# Patient Record
Sex: FEMALE | Race: WHITE | Employment: UNEMPLOYED | URBAN - METROPOLITAN AREA
[De-identification: names, ages, dates, MRNs, and addresses within clinical notes are randomized per-mention and may not be internally consistent; named-entity substitution may affect disease eponyms.]

---

## 2022-08-09 ENCOUNTER — OFFICE VISIT (OUTPATIENT)
Dept: PRIMARY CARE CLINIC | Age: 52
End: 2022-08-09
Payer: MEDICAID

## 2022-08-09 VITALS
HEIGHT: 62 IN | DIASTOLIC BLOOD PRESSURE: 72 MMHG | HEART RATE: 86 BPM | SYSTOLIC BLOOD PRESSURE: 120 MMHG | OXYGEN SATURATION: 98 % | WEIGHT: 184.3 LBS | BODY MASS INDEX: 33.92 KG/M2 | TEMPERATURE: 97.2 F

## 2022-08-09 DIAGNOSIS — R05.9 COUGH: ICD-10-CM

## 2022-08-09 DIAGNOSIS — Z00.00 HEALTHCARE MAINTENANCE: ICD-10-CM

## 2022-08-09 DIAGNOSIS — R53.83 FATIGUE, UNSPECIFIED TYPE: ICD-10-CM

## 2022-08-09 DIAGNOSIS — D25.9 UTERINE LEIOMYOMA, UNSPECIFIED LOCATION: ICD-10-CM

## 2022-08-09 DIAGNOSIS — R91.1 LUNG NODULE: ICD-10-CM

## 2022-08-09 DIAGNOSIS — Z12.31 SCREENING MAMMOGRAM FOR HIGH-RISK PATIENT: ICD-10-CM

## 2022-08-09 DIAGNOSIS — E78.5 HYPERLIPIDEMIA, UNSPECIFIED HYPERLIPIDEMIA TYPE: Primary | ICD-10-CM

## 2022-08-09 DIAGNOSIS — F32.9 MAJOR DEPRESSIVE DISORDER WITH CURRENT ACTIVE EPISODE, UNSPECIFIED DEPRESSION EPISODE SEVERITY, UNSPECIFIED WHETHER RECURRENT: ICD-10-CM

## 2022-08-09 DIAGNOSIS — Z00.00 EXAMINATION: ICD-10-CM

## 2022-08-09 DIAGNOSIS — Z13.29 SCREENING FOR HYPOTHYROIDISM: ICD-10-CM

## 2022-08-09 PROCEDURE — 99205 OFFICE O/P NEW HI 60 MIN: CPT | Performed by: INTERNAL MEDICINE

## 2022-08-09 PROCEDURE — 93000 ELECTROCARDIOGRAM COMPLETE: CPT | Performed by: INTERNAL MEDICINE

## 2022-08-09 RX ORDER — FLUOXETINE HYDROCHLORIDE 40 MG/1
CAPSULE ORAL
COMMUNITY
End: 2022-08-09

## 2022-08-09 RX ORDER — ATORVASTATIN CALCIUM 20 MG/1
TABLET, FILM COATED ORAL
COMMUNITY

## 2022-08-09 RX ORDER — FLUOXETINE HYDROCHLORIDE 20 MG/1
20 CAPSULE ORAL DAILY
Qty: 30 CAPSULE | Refills: 0 | Status: SHIPPED | OUTPATIENT
Start: 2022-08-09

## 2022-08-09 NOTE — PROGRESS NOTES
Chief Complaint   Patient presents with    New Patient     1. States she has 'tumors' in uterus, lung and liver. and sleeps 12-14 hours a day with feelings of exhaustion and feelings of weakness       HPI:  Patient is here established as a new patient she moved to [place] from [place]  Problem #1 extreme fatigue she is sleeps 12 to 14 hours a day gets tired when she does anything she had a history of depression was taking Prozac 40 mg capsule daily stopped taking it when she lost her insurance about a year ago  Had suicidal attempt 2001 by taking pills no suicidal ideation at the present time    Problem #2 patient stated that she has tumor in her uterus liver and lung she had been in menopause for 6-month she had a CT scan in December 2020 at [facility] showed left-sided fibroid tumor in the uterus with hemangiomas on the liver and a 13 mm noncalcified lung nodule  follow-up recommended. Patient is a smoker half a pack a day for 14 years  : #3 patient stated that she had a history of mitral valve prolapse also stated that she had a small heart attack never hospitalized for it? No history of heart catheterization or follow-up with cardiology  Problem #4 patient was on atorvastatin but has not taken it for less than a year. Past Medical History, Surgical History, and Family History has been reviewed and updated.     Review of Systems:  Constitutional:  No fever, no fatigue, no chills, no headaches, no weight change  Dermatology:  No rash, no mole, no dry or sensitive skin  ENT:  No cough, no sore throat, no sinus pain, no runny nose, no ear pain  Cardiology:  No chest pain, no palpitations, no leg edema, no shortness of breath, no PND  Gastroenterology:  No dysphagia, no abdominal pain, no nausea, no vomiting, no constipation, no diarrhea, no heartburn  Musculoskeletal:  No joint pain, no leg cramps, no back pain, no muscle aches  Respiratory:  No shortness of breath, no orthopnea, no wheezing, no HILL, no hemoptysis  Urology:  No blood in the urine, no urinary frequency, no urinary incontinence, no urinary urgency, no nocturia, no dysuria    Vitals:    08/09/22 1517   BP: 120/72   Site: Right Upper Arm   Position: Sitting   Cuff Size: Large Adult   Pulse: 86   Temp: 97.2 °F (36.2 °C)   SpO2: 98%   Weight: 184 lb 4.8 oz (83.6 kg)   Height: 5' 2.1\" (1.577 m)       General:  Patient alert and oriented x 3, NAD, pleasant  HEENT:  Atraumatic, normocephalic, PERRLA, EOMI, clear conjunctiva, TMs clear, nose-clear, throat - no erythema  Neck:  Supple, no goiter, no carotid bruits, no LAD  Lungs:  CTA   Heart:  RRR, no murmurs, gallops or rubs  Abdomen:  Soft/nt/nd, + bowel sounds  Extremities:  No clubbing, cyanosis or edema  Skin: unremarkable  Breast exam chaperoned by RN unremarkable bilaterally    No results found for: LABA1C, CHOL, TRIG, HDL, LDLCHOLESTEROL, LDLCALC, LABVLDL, VLDL, CHOLHDLRATIO, NA, K, CL, CO2, BUN, CREATININE, GLUCOSE, CALCIUM, PROT, LABALBU, BILITOT, ALKPHOS, AST, ALT, LABGLOM, GFRAA, AGRATIO, GLOB     No results found. Assessment/Plan:   Generalized fatigue. Fibroid uterus  Hepatic hemangiomas  Lung nodule  Depression  History of hyperlipidemia  Health maintenance, mammogram    Outpatient Encounter Medications as of 8/9/2022   Medication Sig Dispense Refill    FLUoxetine (PROZAC) 20 MG capsule Take 1 capsule by mouth in the morning. 30 capsule 0    atorvastatin (LIPITOR) 20 MG tablet atorvastatin 20 mg tablet   TAKE 1 TABLET BY MOUTH EVERY DAY      [DISCONTINUED] FLUoxetine (PROZAC) 40 MG capsule fluoxetine 40 mg capsule   Take 1 capsule every day by oral route in the morning. No facility-administered encounter medications on file as of 8/9/2022. Scott Rocha was seen today for new patient. Diagnoses and all orders for this visit:    Hyperlipidemia, unspecified hyperlipidemia type  -     LIPID PANEL; Future  -     Comprehensive Metabolic Panel;  Future    Examination  -     EKG 12 Lead    Screening mammogram for high-risk patient  -     [facility] DIGITAL SCREEN W OR WO CAD BILATERAL; Future    Fatigue, unspecified type  -     CBC with Auto Differential; Future  -     Sedimentation Rate; Future    Screening for hypothyroidism  -     TSH; Future    Cough  -     XR CHEST STANDARD (2 VW); Future    Major depressive disorder with current active episode, unspecified depression episode severity, unspecified whether recurrent  -     FLUoxetine (PROZAC) 20 MG capsule; Take 1 capsule by mouth in the morning. Uterine leiomyoma, unspecified location  -     US PELVIS COMPLETE; Future    Healthcare maintenance  -     Hepatitis C Antibody; Future  -     HIV Screen; Future    Lung nodule       There are no Patient Instructions on file for this visit.            Addi Nelson MD   8/9/22

## 2022-08-15 ENCOUNTER — TELEPHONE (OUTPATIENT)
Dept: PRIMARY CARE CLINIC | Age: 52
End: 2022-08-15

## 2022-08-15 NOTE — TELEPHONE ENCOUNTER
----- Message from [employer] sent at 8/12/2022  3:48 PM EDT -----  Subject: Message to Provider    QUESTIONS  Information for Provider? Patient is scheduled for a mammogram and US on   8/20. She would like call back to confirm that Dr. Preston Noguera will have the   results in time for her 8/25 appointment. If Dr. Preston Noguera would like to see   her before the results are in that is ok, if he wants to wait until   results are in patient would like to reschedule.   ---------------------------------------------------------------------------  --------------  [address] [facility]  [phone]; OK to leave message on voicemail  ---------------------------------------------------------------------------  --------------  SCRIPT ANSWERS  Relationship to Patient?  Self

## 2022-08-20 ENCOUNTER — HOSPITAL ENCOUNTER (OUTPATIENT)
Dept: ULTRASOUND IMAGING | Age: 52
Discharge: HOME | End: 2022-08-20
Payer: MEDICAID

## 2022-08-20 ENCOUNTER — HOSPITAL ENCOUNTER (OUTPATIENT)
Dept: MAMMOGRAPHY | Age: 52
Discharge: HOME | End: 2022-08-22
Payer: MEDICAID

## 2022-08-20 VITALS — WEIGHT: 182 LBS | BODY MASS INDEX: 31.07 KG/M2 | HEIGHT: 64 IN

## 2022-08-20 DIAGNOSIS — R10.2 PELVIC PAIN: ICD-10-CM

## 2022-08-20 DIAGNOSIS — Z12.31 SCREENING MAMMOGRAM FOR HIGH-RISK PATIENT: ICD-10-CM

## 2022-08-20 DIAGNOSIS — D25.9 UTERINE LEIOMYOMA, UNSPECIFIED LOCATION: ICD-10-CM

## 2022-08-20 PROCEDURE — 76856 US EXAM PELVIC COMPLETE: CPT

## 2022-08-20 PROCEDURE — 93976 VASCULAR STUDY: CPT

## 2022-08-20 PROCEDURE — 77067 SCR MAMMO BI INCL CAD: CPT

## 2022-08-25 ENCOUNTER — TELEPHONE (OUTPATIENT)
Dept: PRIMARY CARE CLINIC | Age: 52
End: 2022-08-25

## 2022-08-25 DIAGNOSIS — R92.8 ABNORMAL MAMMOGRAM OF LEFT BREAST: Primary | ICD-10-CM

## 2022-08-26 ENCOUNTER — TELEPHONE (OUTPATIENT)
Dept: MAMMOGRAPHY | Age: 52
End: 2022-08-26

## 2022-08-26 DIAGNOSIS — R92.8 ABNORMAL MAMMOGRAM: Primary | ICD-10-CM

## 2022-08-26 NOTE — TELEPHONE ENCOUNTER
Call to Dr. Ev Livingston office regarding incorrect orders in Epic that need adjusted. Current order is for diagnostic mammogram for bilateral breasts and radiologist recommended follow up for left breast only ([id number]). Office states Dr. Skyla Boone is out of office for the 2 weeks and will be unavailable until then.  JACINTO OneillN, RN -Breast Nurse Navigator

## 2022-08-30 ENCOUNTER — TELEPHONE (OUTPATIENT)
Dept: MAMMOGRAPHY | Age: 52
End: 2022-08-30

## 2022-08-30 NOTE — TELEPHONE ENCOUNTER
Left message for patient to return call regarding her recent mammogram performed at [facility] and to schedule the additional imaging recommended by the radiologist. Call back number provided.  Jayjay Law, JAICNTON, RN -Breast Nurse Navigator

## 2022-09-06 ENCOUNTER — TELEPHONE (OUTPATIENT)
Dept: MAMMOGRAPHY | Age: 52
End: 2022-09-06

## 2022-09-06 NOTE — TELEPHONE ENCOUNTER
2nd attempt to reach patient, left message regarding her recent mammogram and to schedule the additional imaging recommended by the radiologist. Call back number provided.  JACINTO LinkN, RN -Breast Nurse Navigator

## 2022-09-14 ENCOUNTER — TELEPHONE (OUTPATIENT)
Dept: MAMMOGRAPHY | Age: 52
End: 2022-09-14

## 2022-09-14 NOTE — TELEPHONE ENCOUNTER
3rd attempt to reach patient, left message regarding her recent mammogram and to schedule the additional imaging recommended by the radiologist. Call back number provided.  JACINTO WestbrookN, RN -Breast Nurse Navigator

## 2022-10-25 ENCOUNTER — TELEPHONE (OUTPATIENT)
Dept: PRIMARY CARE CLINIC | Age: 52
End: 2022-10-25

## 2022-11-14 ENCOUNTER — HOSPITAL ENCOUNTER (OUTPATIENT)
Dept: ULTRASOUND IMAGING | Age: 52
Discharge: HOME | End: 2022-11-14
Payer: MEDICAID

## 2022-11-14 ENCOUNTER — HOSPITAL ENCOUNTER (OUTPATIENT)
Dept: MAMMOGRAPHY | Age: 52
Discharge: HOME | End: 2022-11-16
Payer: MEDICAID

## 2022-11-14 DIAGNOSIS — R92.8 ABNORMAL MAMMOGRAM: ICD-10-CM

## 2022-11-14 PROCEDURE — 77065 DX MAMMO INCL CAD UNI: CPT

## 2022-11-14 PROCEDURE — 76642 ULTRASOUND BREAST LIMITED: CPT

## 2022-11-22 DIAGNOSIS — F32.9 MAJOR DEPRESSIVE DISORDER WITH CURRENT ACTIVE EPISODE, UNSPECIFIED DEPRESSION EPISODE SEVERITY, UNSPECIFIED WHETHER RECURRENT: ICD-10-CM

## 2022-11-22 RX ORDER — FLUOXETINE HYDROCHLORIDE 20 MG/1
20 CAPSULE ORAL DAILY
Qty: 30 CAPSULE | Refills: 0 | Status: SHIPPED | OUTPATIENT
Start: 2022-11-22

## 2022-11-22 NOTE — TELEPHONE ENCOUNTER
----- Message from Trisha Henson LPN sent at 16/63/2060  1:48 PM EST -----  Subject: Refill Request    QUESTIONS  Name of Medication? FLUoxetine (PROZAC) 20 MG capsule  Patient-reported dosage and instructions? 1 daily  How many days do you have left? 4  Preferred Pharmacy? [facility] [address]  Pharmacy phone number (if available)? [phone]  Additional Information for Provider? appt scheduled 11/30 for appt   ---------------------------------------------------------------------------  --------------  CALL BACK INFO  What is the best way for the office to contact you? OK to leave message on   voicemail  Preferred Call Back Phone Number? [phone]  ---------------------------------------------------------------------------  --------------  SCRIPT ANSWERS  Relationship to Patient?  Self

## 2022-12-20 ENCOUNTER — OFFICE VISIT (OUTPATIENT)
Dept: PRIMARY CARE CLINIC | Age: 52
End: 2022-12-20
Payer: MEDICAID

## 2022-12-20 VITALS
HEIGHT: 64 IN | HEART RATE: 77 BPM | TEMPERATURE: 97.3 F | DIASTOLIC BLOOD PRESSURE: 70 MMHG | BODY MASS INDEX: 29.93 KG/M2 | OXYGEN SATURATION: 96 % | SYSTOLIC BLOOD PRESSURE: 108 MMHG | WEIGHT: 175.3 LBS

## 2022-12-20 DIAGNOSIS — E78.5 HYPERLIPIDEMIA, UNSPECIFIED HYPERLIPIDEMIA TYPE: Primary | ICD-10-CM

## 2022-12-20 DIAGNOSIS — F32.9 MAJOR DEPRESSIVE DISORDER WITH CURRENT ACTIVE EPISODE, UNSPECIFIED DEPRESSION EPISODE SEVERITY, UNSPECIFIED WHETHER RECURRENT: ICD-10-CM

## 2022-12-20 PROCEDURE — 99213 OFFICE O/P EST LOW 20 MIN: CPT | Performed by: INTERNAL MEDICINE

## 2022-12-20 RX ORDER — FLUOXETINE HYDROCHLORIDE 20 MG/1
20 CAPSULE ORAL DAILY
Qty: 90 CAPSULE | Refills: 0 | Status: SHIPPED | OUTPATIENT
Start: 2022-12-20 | End: 2023-03-20

## 2022-12-20 SDOH — ECONOMIC STABILITY: INCOME INSECURITY: IN THE LAST 12 MONTHS, WAS THERE A TIME WHEN YOU WERE NOT ABLE TO PAY THE MORTGAGE OR RENT ON TIME?: YES

## 2022-12-20 SDOH — ECONOMIC STABILITY: TRANSPORTATION INSECURITY
IN THE PAST 12 MONTHS, HAS LACK OF TRANSPORTATION KEPT YOU FROM MEETINGS, WORK, OR FROM GETTING THINGS NEEDED FOR DAILY LIVING?: YES

## 2022-12-20 SDOH — ECONOMIC STABILITY: FOOD INSECURITY: WITHIN THE PAST 12 MONTHS, YOU WORRIED THAT YOUR FOOD WOULD RUN OUT BEFORE YOU GOT MONEY TO BUY MORE.: OFTEN TRUE

## 2022-12-20 SDOH — ECONOMIC STABILITY: TRANSPORTATION INSECURITY
IN THE PAST 12 MONTHS, HAS THE LACK OF TRANSPORTATION KEPT YOU FROM MEDICAL APPOINTMENTS OR FROM GETTING MEDICATIONS?: YES

## 2022-12-20 SDOH — ECONOMIC STABILITY: FOOD INSECURITY: WITHIN THE PAST 12 MONTHS, THE FOOD YOU BOUGHT JUST DIDN'T LAST AND YOU DIDN'T HAVE MONEY TO GET MORE.: OFTEN TRUE

## 2022-12-20 SDOH — ECONOMIC STABILITY: HOUSING INSECURITY
IN THE LAST 12 MONTHS, WAS THERE A TIME WHEN YOU DID NOT HAVE A STEADY PLACE TO SLEEP OR SLEPT IN A SHELTER (INCLUDING NOW)?: YES

## 2022-12-20 ASSESSMENT — COLUMBIA-SUICIDE SEVERITY RATING SCALE - C-SSRS
6. HAVE YOU EVER DONE ANYTHING, STARTED TO DO ANYTHING, OR PREPARED TO DO ANYTHING TO END YOUR LIFE?: YES
1. WITHIN THE PAST MONTH, HAVE YOU WISHED YOU WERE DEAD OR WISHED YOU COULD GO TO SLEEP AND NOT WAKE UP?: NO
2. HAVE YOU ACTUALLY HAD ANY THOUGHTS OF KILLING YOURSELF?: NO
7. DID THIS OCCUR IN THE LAST THREE MONTHS: NO

## 2022-12-20 ASSESSMENT — PATIENT HEALTH QUESTIONNAIRE - PHQ9
5. POOR APPETITE OR OVEREATING: 0
2. FEELING DOWN, DEPRESSED OR HOPELESS: 3
1. LITTLE INTEREST OR PLEASURE IN DOING THINGS: 3
SUM OF ALL RESPONSES TO PHQ QUESTIONS 1-9: 19
10. IF YOU CHECKED OFF ANY PROBLEMS, HOW DIFFICULT HAVE THESE PROBLEMS MADE IT FOR YOU TO DO YOUR WORK, TAKE CARE OF THINGS AT HOME, OR GET ALONG WITH OTHER PEOPLE: 2
8. MOVING OR SPEAKING SO SLOWLY THAT OTHER PEOPLE COULD HAVE NOTICED. OR THE OPPOSITE, BEING SO FIGETY OR RESTLESS THAT YOU HAVE BEEN MOVING AROUND A LOT MORE THAN USUAL: 0
4. FEELING TIRED OR HAVING LITTLE ENERGY: 3
9. THOUGHTS THAT YOU WOULD BE BETTER OFF DEAD, OR OF HURTING YOURSELF: 1
SUM OF ALL RESPONSES TO PHQ QUESTIONS 1-9: 19
SUM OF ALL RESPONSES TO PHQ9 QUESTIONS 1 & 2: 6
7. TROUBLE CONCENTRATING ON THINGS, SUCH AS READING THE NEWSPAPER OR WATCHING TELEVISION: 3
3. TROUBLE FALLING OR STAYING ASLEEP: 3
SUM OF ALL RESPONSES TO PHQ QUESTIONS 1-9: 19
SUM OF ALL RESPONSES TO PHQ QUESTIONS 1-9: 18
6. FEELING BAD ABOUT YOURSELF - OR THAT YOU ARE A FAILURE OR HAVE LET YOURSELF OR YOUR FAMILY DOWN: 3

## 2022-12-20 ASSESSMENT — SOCIAL DETERMINANTS OF HEALTH (SDOH): HOW HARD IS IT FOR YOU TO PAY FOR THE VERY BASICS LIKE FOOD, HOUSING, MEDICAL CARE, AND HEATING?: VERY HARD

## 2022-12-20 NOTE — PROGRESS NOTES
Chief Complaint   Patient presents with    Hyperlipidemia     Weight loss of 7 lbs, check up and medication refill    Medication Refill    Depression       HPI:  Patient is here for follow-up   Patient had screening mammogram done in the follow-up on the left breast.  Did not do blood work because she has no vehicle, has difficulty with getting a ride. Requested refills on Prozac denied any suicidal ideation or suicidal attempts. She was advised to see psychologist.  She is agreeable she was referred to [facility]. Patient has not been on her atorvastatin for a year. .    Past Medical History, Surgical History, and Family History has been reviewed and updated.     Review of Systems:  Constitutional:  No fever, no fatigue, no chills, no headaches, no weight change  Dermatology:  No rash, no mole, no dry or sensitive skin  ENT:  No cough, no sore throat, no sinus pain, no runny nose, no ear pain  Cardiology:  No chest pain, no palpitations, no leg edema, no shortness of breath, no PND  Gastroenterology:  No dysphagia, no abdominal pain, no nausea, no vomiting, no constipation, no diarrhea, no heartburn  Musculoskeletal:  No joint pain, no leg cramps, no back pain, no muscle aches  Respiratory:  No shortness of breath, no orthopnea, no wheezing, no HILL, no hemoptysis  Urology:  No blood in the urine, no urinary frequency, no urinary incontinence, no urinary urgency, no nocturia, no dysuria    Vitals:    12/20/22 1530   BP: 108/70   Site: Right Upper Arm   Position: Sitting   Cuff Size: Medium Adult   Pulse: 77   Temp: 97.3 °F (36.3 °C)   TempSrc: Temporal   SpO2: 96%   Weight: 175 lb 4.8 oz (79.5 kg)   Height: 5' 4\" (1.626 m)       General:  Patient alert and oriented x 3, NAD, pleasant  HEENT:  Atraumatic, normocephalic, PERRLA, EOMI, clear conjunctiva, TMs clear, nose-clear, throat - no erythema  Neck:  Supple, no goiter, no carotid bruits, no LAD  Lungs:  CTA   Heart:  RRR, no murmurs, gallops or rubs  Abdomen: Soft/nt/nd, + bowel sounds  Lymph node examination: unremarkable  Neurological exam : unremarkable  Extremities:  No clubbing, cyanosis or edema  Skin: unremarkable    No results found for: LABA1C, CHOL, TRIG, HDL, LDLCHOLESTEROL, LDLCALC, LABVLDL, VLDL, CHOLHDLRATIO, NA, K, CL, CO2, BUN, CREATININE, GLUCOSE, CALCIUM, PROT, LABALBU, BILITOT, ALKPHOS, AST, ALT, LABGLOM, GFRAA, AGRATIO, GLOB     No results found. Assessment/Plan:    Hyperlipidemia noncompliant on statin. Depression controlled. Counseling referral done    Outpatient Encounter Medications as of 12/20/2022   Medication Sig Dispense Refill    FLUoxetine (PROZAC) 20 MG capsule Take 1 capsule by mouth daily 90 capsule 0    [DISCONTINUED] FLUoxetine (PROZAC) 20 MG capsule Take 1 capsule by mouth daily 30 capsule 0    atorvastatin (LIPITOR) 20 MG tablet atorvastatin 20 mg tablet   TAKE 1 TABLET BY MOUTH EVERY DAY       No facility-administered encounter medications on file as of 12/20/2022. Morgan Woodward was seen today for hyperlipidemia, medication refill and depression. Diagnoses and all orders for this visit:    Hyperlipidemia, unspecified hyperlipidemia type    Major depressive disorder with current active episode, unspecified depression episode severity, unspecified whether recurrent  -     FLUoxetine (PROZAC) 20 MG capsule; Take 1 capsule by mouth daily  -     Shana Begum LISW, [facility]       There are no Patient Instructions on file for this visit. On this date 12/20/2022 I have spent 22 minutes reviewing previous notes, test results and face to face with the patient discussing the diagnosis and importance of compliance with the treatment plan as well as documenting on the day of the visit.       Leigha Han MD   12/20/22

## 2023-01-05 ENCOUNTER — TELEPHONE (OUTPATIENT)
Dept: INTERNAL MEDICINE | Age: 53
End: 2023-01-05

## 2023-01-09 ENCOUNTER — COMMUNITY OUTREACH (OUTPATIENT)
Dept: PRIMARY CARE CLINIC | Age: 53
End: 2023-01-09

## 2023-01-16 ENCOUNTER — TELEPHONE (OUTPATIENT)
Dept: INTERNAL MEDICINE | Age: 53
End: 2023-01-16

## 2023-02-09 ENCOUNTER — TELEPHONE (OUTPATIENT)
Dept: INTERNAL MEDICINE | Age: 53
End: 2023-02-09

## 2023-02-09 NOTE — TELEPHONE ENCOUNTER
ULI left message for pt x3 related to counseling referral. SW provided contact information for return call.

## 2023-03-06 ENCOUNTER — TELEPHONE (OUTPATIENT)
Dept: FAMILY MEDICINE CLINIC | Age: 53
End: 2023-03-06

## 2023-03-06 NOTE — TELEPHONE ENCOUNTER
----- Message from [facility] sent at 3/6/2023  2:38 PM EST -----  Subject: Appointment Request    Reason for Call: New Patient/New to Provider Appointment needed: New   Patient Request Appointment    QUESTIONS    Reason for appointment request? No appointments available during search     Additional Information for Provider? pt is wanting to estabish as new in   practice Dr Brice Mohr please call pt asap to mike attempted   dr Margi Rios up to Oct 2023 no appts found attempted calling office line is   busy  ---------------------------------------------------------------------------  --------------  [address] [employer]  [phone];  Do not leave any message, patient will call back for answer  ---------------------------------------------------------------------------  --------------  SCRIPT ANSWERS  COVID Screen: Nadia Verdugo

## 2023-03-23 ENCOUNTER — OFFICE VISIT (OUTPATIENT)
Dept: PRIMARY CARE CLINIC | Age: 53
End: 2023-03-23
Payer: MEDICAID

## 2023-03-23 VITALS
DIASTOLIC BLOOD PRESSURE: 62 MMHG | TEMPERATURE: 97.9 F | WEIGHT: 176.8 LBS | OXYGEN SATURATION: 97 % | SYSTOLIC BLOOD PRESSURE: 108 MMHG | HEART RATE: 82 BPM | HEIGHT: 64 IN | BODY MASS INDEX: 30.19 KG/M2

## 2023-03-23 DIAGNOSIS — F32.9 MAJOR DEPRESSIVE DISORDER WITH CURRENT ACTIVE EPISODE, UNSPECIFIED DEPRESSION EPISODE SEVERITY, UNSPECIFIED WHETHER RECURRENT: Primary | ICD-10-CM

## 2023-03-23 DIAGNOSIS — E78.5 HYPERLIPIDEMIA, UNSPECIFIED HYPERLIPIDEMIA TYPE: ICD-10-CM

## 2023-03-23 PROCEDURE — 99213 OFFICE O/P EST LOW 20 MIN: CPT | Performed by: INTERNAL MEDICINE

## 2023-03-23 RX ORDER — FLUOXETINE HYDROCHLORIDE 20 MG/1
20 CAPSULE ORAL DAILY
Qty: 90 CAPSULE | Refills: 0 | Status: SHIPPED | OUTPATIENT
Start: 2023-03-23 | End: 2023-06-21

## 2023-03-23 ASSESSMENT — PATIENT HEALTH QUESTIONNAIRE - PHQ9
1. LITTLE INTEREST OR PLEASURE IN DOING THINGS: 0
4. FEELING TIRED OR HAVING LITTLE ENERGY: 1
9. THOUGHTS THAT YOU WOULD BE BETTER OFF DEAD, OR OF HURTING YOURSELF: 0
5. POOR APPETITE OR OVEREATING: 0
3. TROUBLE FALLING OR STAYING ASLEEP: 1
10. IF YOU CHECKED OFF ANY PROBLEMS, HOW DIFFICULT HAVE THESE PROBLEMS MADE IT FOR YOU TO DO YOUR WORK, TAKE CARE OF THINGS AT HOME, OR GET ALONG WITH OTHER PEOPLE: 1
SUM OF ALL RESPONSES TO PHQ QUESTIONS 1-9: 3
8. MOVING OR SPEAKING SO SLOWLY THAT OTHER PEOPLE COULD HAVE NOTICED. OR THE OPPOSITE, BEING SO FIGETY OR RESTLESS THAT YOU HAVE BEEN MOVING AROUND A LOT MORE THAN USUAL: 0
SUM OF ALL RESPONSES TO PHQ QUESTIONS 1-9: 3
SUM OF ALL RESPONSES TO PHQ QUESTIONS 1-9: 3
2. FEELING DOWN, DEPRESSED OR HOPELESS: 1
SUM OF ALL RESPONSES TO PHQ9 QUESTIONS 1 & 2: 1
7. TROUBLE CONCENTRATING ON THINGS, SUCH AS READING THE NEWSPAPER OR WATCHING TELEVISION: 0
6. FEELING BAD ABOUT YOURSELF - OR THAT YOU ARE A FAILURE OR HAVE LET YOURSELF OR YOUR FAMILY DOWN: 0
SUM OF ALL RESPONSES TO PHQ QUESTIONS 1-9: 3

## 2023-03-27 NOTE — PROGRESS NOTES
TRIG, HDL, LDLCHOLESTEROL, LDLCALC, LABVLDL, VLDL, CHOLHDLRATIO, NA, K, CL, CO2, BUN, CREATININE, GLUCOSE, CALCIUM, PROT, LABALBU, BILITOT, ALKPHOS, AST, ALT, LABGLOM, GFRAA, AGRATIO, GLOB     No results found. Assessment/Plan:   Depression controlled  Hyperlipidemia    Outpatient Encounter Medications as of 3/23/2023   Medication Sig Dispense Refill    FLUoxetine (PROZAC) 20 MG capsule Take 1 capsule by mouth daily 90 capsule 0    [DISCONTINUED] FLUoxetine (PROZAC) 20 MG capsule Take 1 capsule by mouth daily 90 capsule 0    atorvastatin (LIPITOR) 20 MG tablet atorvastatin 20 mg tablet   TAKE 1 TABLET BY MOUTH EVERY DAY (Patient not taking: Reported on 3/23/2023)       No facility-administered encounter medications on file as of 3/23/2023. Gail Galloway was seen today for depression. Diagnoses and all orders for this visit:    Major depressive disorder with current active episode, unspecified depression episode severity, unspecified whether recurrent  -     FLUoxetine (PROZAC) 20 MG capsule; Take 1 capsule by mouth daily    Hyperlipidemia, unspecified hyperlipidemia type         There are no Patient Instructions on file for this visit. On this date 3/23/2023 I have spent 25 minutes reviewing previous notes, test results and face to face with the patient discussing the diagnosis and importance of compliance with the treatment plan as well as documenting on the day of the visit.       Pb Philippe MD   3/27/23

## 2023-06-22 DIAGNOSIS — F32.9 MAJOR DEPRESSIVE DISORDER WITH CURRENT ACTIVE EPISODE, UNSPECIFIED DEPRESSION EPISODE SEVERITY, UNSPECIFIED WHETHER RECURRENT: ICD-10-CM

## 2023-06-22 RX ORDER — FLUOXETINE HYDROCHLORIDE 20 MG/1
CAPSULE ORAL
Qty: 90 CAPSULE | Refills: 0 | Status: SHIPPED | OUTPATIENT
Start: 2023-06-22

## 2025-04-07 ENCOUNTER — PROCEDURE VISIT (OUTPATIENT)
Dept: AUDIOLOGY | Age: 55
End: 2025-04-07
Payer: MEDICAID

## 2025-04-07 DIAGNOSIS — H93.233 HYPERACUSIS OF BOTH EARS: ICD-10-CM

## 2025-04-07 DIAGNOSIS — H93.13 TINNITUS OF BOTH EARS: ICD-10-CM

## 2025-04-07 DIAGNOSIS — H90.3 SENSORINEURAL HEARING LOSS, BILATERAL: Primary | ICD-10-CM

## 2025-04-07 PROCEDURE — 92557 COMPREHENSIVE HEARING TEST: CPT

## 2025-04-07 PROCEDURE — 92567 TYMPANOMETRY: CPT

## 2025-04-10 NOTE — PROGRESS NOTES
AUDIOMETRIC EVALUATION    REASON FOR REFERRAL:  This patient was referred for audiometric testing by self due to decrease hearing, sensitivity to sounds, and tinnitus. Patient reported that she utilizes foam ear plugs due to an increased sensitivity to sound. Patient also reported that she believes that she is struggling to ear, particularly when in the presence of background noise. Patient denied any recent ear pain, pressure, drainage, dizziness and vertigo, and history of noise exposure. The remainder of the case history was unremarkable.        RESULTS:  Pure tone audiometric testing using earphones was carried out. Results revealed air conduction thresholds averaging 15 dB HL through 4000 Hz sloping to thresholds of 50-55 dB HL at 8000 Hz, bilaterally. Speech reception thresholds were obtained at 15 dB HL, bilaterally. Speech discrimination testing was performed at 50 dB HL, bilaterally. Obtained were scores of 100% in the right ear and 96% in the left ear. Unmasked Bone Conduction Testing revealed no significant air-bone gaps consistent with sensorineural hearing loss, bilaterally.  Tympanometry was administered and revealed normal middle ear peak pressure and compliance, bilaterally (type A tympanograms).       IMPRESSION:  Today’s results revealed hearing sensitivity within normal limits through 4000 Hz sloping to a moderate sensorineural hearing loss beyond, bilaterally.      An ENT consult is suggested if you feel it is clinically warranted. A re-evaluation is recommended annually or as changes in hearing are observed.     The above results were reviewed with the patient/parent.      If I can be of further assistance or provide additional information, please do not hesitate to contact this office.      Thank you for the referral.        ___________________________________  Jodi Cross, CCC-A  Clinical Audiologist  [place] License: [id number]    Electronically signed by Jessie Shabazz on